# Patient Record
Sex: FEMALE | Race: WHITE | NOT HISPANIC OR LATINO | Employment: FULL TIME | URBAN - METROPOLITAN AREA
[De-identification: names, ages, dates, MRNs, and addresses within clinical notes are randomized per-mention and may not be internally consistent; named-entity substitution may affect disease eponyms.]

---
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
## 2021-07-12 ENCOUNTER — TELEPHONE (OUTPATIENT)
Dept: OBGYN CLINIC | Facility: CLINIC | Age: 21
End: 2021-07-12

## 2021-08-02 ENCOUNTER — VBI (OUTPATIENT)
Dept: ADMINISTRATIVE | Facility: OTHER | Age: 21
End: 2021-08-02

## 2021-08-02 NOTE — TELEPHONE ENCOUNTER
Upon review of the In Basket request we were able to locate, review, and update the patient chart as requested for Chlamydia  Any additional questions or concerns should be emailed to the Practice Liaisons via [email or web address] email, please do not reply via In Basket      Thank you  Ambrosio Wiseman

## 2021-09-23 ENCOUNTER — APPOINTMENT (OUTPATIENT)
Dept: URGENT CARE | Facility: CLINIC | Age: 21
End: 2021-09-23

## 2021-09-23 DIAGNOSIS — Z02.1 PRE-EMPLOYMENT HEALTH SCREENING EXAMINATION: Primary | ICD-10-CM

## 2021-09-23 PROCEDURE — 86480 TB TEST CELL IMMUN MEASURE: CPT

## 2021-09-27 LAB
GAMMA INTERFERON BACKGROUND BLD IA-ACNC: 0.04 IU/ML
M TB IFN-G BLD-IMP: NEGATIVE
M TB IFN-G CD4+ BCKGRND COR BLD-ACNC: -0.01 IU/ML
M TB IFN-G CD4+ BCKGRND COR BLD-ACNC: 0 IU/ML
MITOGEN IGNF BCKGRD COR BLD-ACNC: >10 IU/ML

## 2021-10-11 PROBLEM — Z97.5 IUD (INTRAUTERINE DEVICE) IN PLACE: Status: ACTIVE | Noted: 2021-10-11

## 2021-11-15 PROBLEM — E04.9 ENLARGED THYROID: Status: ACTIVE | Noted: 2021-11-15

## 2021-11-16 ENCOUNTER — ANNUAL EXAM (OUTPATIENT)
Dept: OBGYN CLINIC | Facility: CLINIC | Age: 21
End: 2021-11-16
Payer: COMMERCIAL

## 2021-11-16 VITALS
SYSTOLIC BLOOD PRESSURE: 122 MMHG | DIASTOLIC BLOOD PRESSURE: 76 MMHG | HEIGHT: 65 IN | BODY MASS INDEX: 29.66 KG/M2 | WEIGHT: 178 LBS

## 2021-11-16 DIAGNOSIS — Z97.5 IUD (INTRAUTERINE DEVICE) IN PLACE: ICD-10-CM

## 2021-11-16 DIAGNOSIS — Z01.419 ENCOUNTER FOR GYNECOLOGICAL EXAMINATION (GENERAL) (ROUTINE) WITHOUT ABNORMAL FINDINGS: Primary | ICD-10-CM

## 2021-11-16 DIAGNOSIS — Z12.4 SCREENING FOR MALIGNANT NEOPLASM OF THE CERVIX: ICD-10-CM

## 2021-11-16 PROCEDURE — 99395 PREV VISIT EST AGE 18-39: CPT | Performed by: OBSTETRICS & GYNECOLOGY

## 2021-11-24 LAB
C TRACH RRNA SPEC QL NAA+PROBE: NOT DETECTED
CLINICAL INFO: NORMAL
CYTO CVX: NORMAL
DATE PREVIOUS BX: NORMAL
LMP START DATE: NORMAL
N GONORRHOEA RRNA SPEC QL NAA+PROBE: NOT DETECTED
SL AMB PREV. PAP:: NORMAL
SPECIMEN SOURCE CVX/VAG CYTO: NORMAL

## 2022-09-13 ENCOUNTER — TELEPHONE (OUTPATIENT)
Dept: OBGYN CLINIC | Facility: CLINIC | Age: 22
End: 2022-09-13

## 2022-09-13 NOTE — TELEPHONE ENCOUNTER
Patient called stating she having some BTB with the paraguard IUD  She had her period 08/14/2022 and ever since, she been having on and off bleeding (not heavy)  She would change around 2-4 pads/tampons in a day  She does have cramping but nothing severe  She had the IUD inserted by Dr Ludwig Green 11/2020  She is SA w/ condom use so per pt states no chance of pregnancy  She did get blood work done through her PCP stating her HGB is normal and her iron panel is borderline low with her menses and recommend her taking iron supplement while on her period  This been going on for a month now with on and off BTB and would like Dr Della Padilla input in regards if this is normal or concerning where she need to come in for evaluation  Dr Nisreen Gomes please review

## 2022-09-14 NOTE — TELEPHONE ENCOUNTER
Spoke with patient and informed Dr Enrique Guajardo is recommending an appointment to evaluate the cervix, cultures if indicated and discuss options  consider  Pt was transferred to reception to assist patient  Crystal in reception informed patient was offered 2 appointments for September and   1 for October, d/t work schedule pt declined appointments  Pt informed reception she will call back and try to arrange work schedule

## 2022-09-14 NOTE — TELEPHONE ENCOUNTER
I think she should come in for an evaluation to check her cervix, culture if necessary and consider other options   Thanks

## 2022-10-08 NOTE — PROGRESS NOTES
Assessment/Plan:    Irregular bleeding  Bleeding since LMP 8/14/22, intermittently heavy, never stopping  Some cramping this week  Same partner for years  Recent job change from [employer] to [employer]  PCP with recent normal labs including CBC and TSH  Normal pelvic exam, IUD string in os  U/S ordered  Unable to provide urine for UPT; hcg ordered  When hcg resulted and neg, will start course of progesterone to regulate probable anovulatory cycle  Diagnoses and all orders for this visit:    Irregular bleeding  -     US pelvis complete w transvaginal; Future  -     norethindrone (AYGESTIN) 5 mg tablet; Take 1 tablet (5 mg total) by mouth daily for 33 doses One tablet three times daily for 2 days, then twice daily for 3 days, then daily to complete 21 days without bleeding   -     hCG, quantitative; Future  -     hCG, quantitative    IUD (intrauterine device) in place - Paragard 11/4/2020  -     US pelvis complete w transvaginal; Future    Other orders  -     PARAGARD INTRAUTERINE COPPER IU; by Intrauterine route          Subjective:      Patient ID: Grace Arreguin is a 25 y o  female  HPI Here with irregular bleeding    The following portions of the patient's history were reviewed and updated as appropriate:   She  has a past medical history of Multiple nevi and Otitis media  She   Patient Active Problem List    Diagnosis Date Noted   • Irregular bleeding 10/10/2022   • Encounter for gynecological examination (general) (routine) without abnormal findings 11/16/2021   • Enlarged thyroid - normal u/s and labs 2018, 2019 11/15/2021   • IUD (intrauterine device) in place - Paragard 11/4/2020 10/11/2021     She  has a past surgical history that includes ADENOIDECTOMY (2003); Mole removal; and Tympanostomy tube placement  Her family history includes Heart attack in her maternal grandfather and paternal grandfather; Hypertension in her father; No Known Problems in her mother    She  reports that she has never smoked  She has never used smokeless tobacco  She reports current alcohol use  She reports that she does not use drugs  Current Outpatient Medications   Medication Sig Dispense Refill   • norethindrone (AYGESTIN) 5 mg tablet Take 1 tablet (5 mg total) by mouth daily for 33 doses One tablet three times daily for 2 days, then twice daily for 3 days, then daily to complete 21 days without bleeding  33 tablet 0   • PARAGARD INTRAUTERINE COPPER IU by Intrauterine route       No current facility-administered medications for this visit  She is allergic to keflex [cephalexin]       Review of Systems  +prolonged bleeding +cramping    Objective:      /60 (BP Location: Left arm, Patient Position: Sitting, Cuff Size: Large)   Ht 5' 3 75" (1 619 m)   Wt 79 6 kg (175 lb 6 4 oz)   LMP 08/14/2022 (Exact Date) Comment: still bleeding   Breastfeeding No   BMI 30 34 kg/m²          Physical Exam    General appearance: no distress, pleasant  Pelvic exam: normal external genitalia, urethral meatus normal, vagina without lesions, cervix with IUD string in os, without lesions, uterus small, non tender, no adnexal masses, non tender  Rectal exam: deferred

## 2022-10-10 ENCOUNTER — OFFICE VISIT (OUTPATIENT)
Dept: OBGYN CLINIC | Facility: CLINIC | Age: 22
End: 2022-10-10

## 2022-10-10 VITALS
BODY MASS INDEX: 29.94 KG/M2 | DIASTOLIC BLOOD PRESSURE: 60 MMHG | SYSTOLIC BLOOD PRESSURE: 138 MMHG | HEIGHT: 64 IN | WEIGHT: 175.4 LBS

## 2022-10-10 DIAGNOSIS — Z97.5 IUD (INTRAUTERINE DEVICE) IN PLACE: ICD-10-CM

## 2022-10-10 DIAGNOSIS — N92.6 IRREGULAR BLEEDING: Primary | ICD-10-CM

## 2022-10-10 NOTE — LETTER
October 10, 2022     DO Jack Ellisi  [id number]    Patient: Ja Hetaon   YOB: 2000   Date of Visit: 10/10/2022       Dear Dr Mehnaz Marx: Thank you for referring Zheng Nahomyace to me for evaluation  Below are my notes for this consultation  If you have questions, please do not hesitate to call me  I look forward to following your patient along with you  Sincerely,        Fernie Elkins MD        CC: No Recipients  Fernie Elkins MD  10/10/2022  5:05 PM  Sign when Signing Visit  Assessment/Plan:    Irregular bleeding  Bleeding since LMP 8/14/22, intermittently heavy, never stopping  Some cramping this week  Same partner for years  Recent job change from [employer] to [employer]  PCP with recent normal labs including CBC and TSH  Normal pelvic exam, IUD string in os  U/S ordered  Unable to provide urine for UPT; hcg ordered  When hcg resulted and neg, will start course of progesterone to regulate probable anovulatory cycle  Diagnoses and all orders for this visit:    Irregular bleeding  -     US pelvis complete w transvaginal; Future  -     norethindrone (AYGESTIN) 5 mg tablet; Take 1 tablet (5 mg total) by mouth daily for 33 doses One tablet three times daily for 2 days, then twice daily for 3 days, then daily to complete 21 days without bleeding   -     hCG, quantitative; Future  -     hCG, quantitative    IUD (intrauterine device) in place - Paragard 11/4/2020  -     US pelvis complete w transvaginal; Future    Other orders  -     PARAGARD INTRAUTERINE COPPER IU; by Intrauterine route          Subjective:      Patient ID: Ja Heaton is a 25 y o  female  HPI Here with irregular bleeding    The following portions of the patient's history were reviewed and updated as appropriate:   She  has a past medical history of Multiple nevi and Otitis media    She   Patient Active Problem List    Diagnosis Date Noted   • Irregular bleeding 10/10/2022 • Encounter for gynecological examination (general) (routine) without abnormal findings 11/16/2021   • Enlarged thyroid - normal u/s and labs 2018, 2019 11/15/2021   • IUD (intrauterine device) in place - Paragard 11/4/2020 10/11/2021     She  has a past surgical history that includes ADENOIDECTOMY (2003); Mole removal; and Tympanostomy tube placement  Her family history includes Heart attack in her maternal grandfather and paternal grandfather; Hypertension in her father; No Known Problems in her mother  She  reports that she has never smoked  She has never used smokeless tobacco  She reports current alcohol use  She reports that she does not use drugs  Current Outpatient Medications   Medication Sig Dispense Refill   • norethindrone (AYGESTIN) 5 mg tablet Take 1 tablet (5 mg total) by mouth daily for 33 doses One tablet three times daily for 2 days, then twice daily for 3 days, then daily to complete 21 days without bleeding  33 tablet 0   • PARAGARD INTRAUTERINE COPPER IU by Intrauterine route       No current facility-administered medications for this visit  She is allergic to keflex [cephalexin]       Review of Systems  +prolonged bleeding +cramping    Objective:      /60 (BP Location: Left arm, Patient Position: Sitting, Cuff Size: Large)   Ht 5' 3 75" (1 619 m)   Wt 79 6 kg (175 lb 6 4 oz)   LMP 08/14/2022 (Exact Date) Comment: still bleeding   Breastfeeding No   BMI 30 34 kg/m²          Physical Exam    General appearance: no distress, pleasant  Pelvic exam: normal external genitalia, urethral meatus normal, vagina without lesions, cervix with IUD string in os, without lesions, uterus small, non tender, no adnexal masses, non tender  Rectal exam: deferred

## 2022-10-10 NOTE — ASSESSMENT & PLAN NOTE
Bleeding since LMP 8/14/22, intermittently heavy, never stopping  Some cramping this week  Same partner for years  Recent job change from [employer] to [employer]  PCP with recent normal labs including CBC and TSH  Normal pelvic exam, IUD string in os  U/S ordered  Unable to provide urine for UPT; hcg ordered  When hcg resulted and neg, will start course of progesterone to regulate probable anovulatory cycle

## 2022-10-10 NOTE — PATIENT INSTRUCTIONS
Schedule your pelvic ultrasound with [facility] [phone]  Please call the office if you do not hear about your results in 10-14 days

## 2022-10-11 ENCOUNTER — TELEPHONE (OUTPATIENT)
Dept: OBGYN CLINIC | Facility: CLINIC | Age: 22
End: 2022-10-11

## 2022-10-11 NOTE — TELEPHONE ENCOUNTER
Spoke with Dr Jordan Iqbal  She previously sent rx and okay with patient starting med tonight  Called and left v/m that pt may start rx tonight  Our phones are going off but we would be available to answer questions tomorrow morning at [phone]

## 2022-10-11 NOTE — TELEPHONE ENCOUNTER
Maxine Warner called in and left v/m  She was unable to provide urine sample at visit yesterday so Dr Mary Alice Reyes ordered hcg before starting on progesterone  Pt got labs done today and is sending them via my chart but states < 1, has US scheduled for tomorrow and curious if Dr MaryA lice Reyes wants to order her progesterone rx to be started today or if she should wait until after US  [address], Dr Mary Alice Reyes with pt but message left with Carole El to request call back from Dr Mary Alice Reyes

## 2022-10-28 DIAGNOSIS — N92.6 IRREGULAR BLEEDING: Primary | ICD-10-CM

## 2022-10-28 RX ORDER — NORGESTIMATE AND ETHINYL ESTRADIOL 0.25-0.035
KIT ORAL
Qty: 28 TABLET | Refills: 1 | Status: SHIPPED | OUTPATIENT
Start: 2022-10-28 | End: 2022-11-22 | Stop reason: ALTCHOICE

## 2022-11-19 NOTE — PROGRESS NOTES
Assessment/Plan:    Encounter for gynecological examination (general) (routine) without abnormal findings  Stopped bleeding on OCPs 2 weeks ago  No other concerns  Normal breast and pelvic exams  Cultures done  Last pap 11/2021 neg; due 2024         Diagnoses and all orders for this visit:    Encounter for gynecological examination (general) (routine) without abnormal findings    Routine screening for STI (sexually transmitted infection)  -     [address] [place] [address] amplified DNA by PCR    IUD (intrauterine device) in place - Paragard 11/4/2020          Subjective:      Patient ID: Kay Angulo is a 25 y o  female  HPI Here for well check  The following portions of the patient's history were reviewed and updated as appropriate:   She  has a past medical history of Multiple nevi and Otitis media  She   Patient Active Problem List    Diagnosis Date Noted   • Encounter for gynecological examination (general) (routine) without abnormal findings 11/16/2021   • Enlarged thyroid - normal u/s and labs 2018, 2019 11/15/2021   • IUD (intrauterine device) in place - Paragard 11/4/2020 10/11/2021     She  has a past surgical history that includes ADENOIDECTOMY (2003); Mole removal; and Tympanostomy tube placement  Her family history includes Heart attack in her maternal grandfather and paternal grandfather; Hypertension in her father; Stroke in her paternal grandfather; Thyroid disease in her mother; Transient ischemic attack in her paternal grandmother  She  reports that she has never smoked  She has never used smokeless tobacco  She reports current alcohol use of about 1 0 standard drink per week  She reports that she does not use drugs    Current Outpatient Medications   Medication Sig Dispense Refill   • PARAGARD INTRAUTERINE COPPER IU by Intrauterine route     • norethindrone (AYGESTIN) 5 mg tablet Take 1 tablet (5 mg total) by mouth daily for 33 doses One tablet three times daily for 2 days, then twice daily for 3 days, then daily to complete 21 days without bleeding  33 tablet 0     No current facility-administered medications for this visit  She is allergic to keflex [cephalexin]       Review of Systems  No breast, bladder, bowel complaints    Objective:      /72 (BP Location: Left arm, Patient Position: Sitting, Cuff Size: Standard)   Ht 5' 3 75" (1 619 m)   Wt 81 4 kg (179 lb 6 4 oz)   BMI 31 04 kg/m²          Physical Exam    General appearance: no distress, pleasant  Neck: thyroid without nodules or thyromegaly, no palpable adenopathy  Lymph nodes: no palpable adenopathy  Skin with multiple nevi (sees plastics)  Breasts: no masses, nodes or skin changes  Abdomen: soft, non tender, no palpable masses  Pelvic exam: normal external genitalia, urethral meatus normal, vagina without lesions, cervix high in vault, IUD string in os, no lesions, uterus small, non tender, no adnexal masses, non tender  Rectal exam: deferred

## 2022-11-22 ENCOUNTER — OFFICE VISIT (OUTPATIENT)
Dept: OBGYN CLINIC | Facility: CLINIC | Age: 22
End: 2022-11-22

## 2022-11-22 VITALS
HEIGHT: 64 IN | DIASTOLIC BLOOD PRESSURE: 72 MMHG | BODY MASS INDEX: 30.63 KG/M2 | WEIGHT: 179.4 LBS | SYSTOLIC BLOOD PRESSURE: 110 MMHG

## 2022-11-22 DIAGNOSIS — Z11.3 ROUTINE SCREENING FOR STI (SEXUALLY TRANSMITTED INFECTION): ICD-10-CM

## 2022-11-22 DIAGNOSIS — Z01.419 ENCOUNTER FOR GYNECOLOGICAL EXAMINATION (GENERAL) (ROUTINE) WITHOUT ABNORMAL FINDINGS: Primary | ICD-10-CM

## 2022-11-22 DIAGNOSIS — Z97.5 IUD (INTRAUTERINE DEVICE) IN PLACE: ICD-10-CM

## 2022-11-22 PROBLEM — N92.6 IRREGULAR BLEEDING: Status: RESOLVED | Noted: 2022-10-10 | Resolved: 2022-11-22

## 2022-11-22 NOTE — ASSESSMENT & PLAN NOTE
Stopped bleeding on OCPs 2 weeks ago  No other concerns  Normal breast and pelvic exams    Cultures done  Last pap 11/2021 neg; due 2024

## 2022-11-22 NOTE — LETTER
November 22, 2022     Meche Casey DO  [facility]  [address]    Patient: Levon Mckee   YOB: 2000   Date of Visit: 11/22/2022       Dear Dr Derick Plaza: Thank you for referring Artur Giang to me for evaluation  Below are my notes for this consultation  If you have questions, please do not hesitate to call me  I look forward to following your patient along with you  Sincerely,        Paradise Calhoun MD        CC: No Recipients  Paradise Calhoun MD  11/22/2022  3:45 PM  Sign when Signing Visit  Assessment/Plan:    Encounter for gynecological examination (general) (routine) without abnormal findings  Stopped bleeding on OCPs 2 weeks ago  No other concerns  Normal breast and pelvic exams  Cultures done  Last pap 11/2021 neg; due 2024        Diagnoses and all orders for this visit:    Encounter for gynecological examination (general) (routine) without abnormal findings    Routine screening for STI (sexually transmitted infection)  -     [address] [place] [address] amplified DNA by PCR    IUD (intrauterine device) in place - [facility] 11/4/2020         Subjective:     Patient ID: Levon Mckee is a 25 y o  female  HPI Here for well check  The following portions of the patient's history were reviewed and updated as appropriate:   She  has a past medical history of Multiple nevi and Otitis media  She   Patient Active Problem List    Diagnosis Date Noted   • Encounter for gynecological examination (general) (routine) without abnormal findings 11/16/2021   • Enlarged thyroid - normal u/s and labs 2018, 2019 11/15/2021   • IUD (intrauterine device) in place - Paragard 11/4/2020 10/11/2021     She  has a past surgical history that includes ADENOIDECTOMY (2003); Mole removal; and Tympanostomy tube placement  Her family history includes Heart attack in her maternal grandfather and paternal grandfather; Hypertension in her father; Stroke in her paternal grandfather;  Thyroid disease in her mother; Transient ischemic attack in her paternal grandmother  She  reports that she has never smoked  She has never used smokeless tobacco  She reports current alcohol use of about 1 0 standard drink per week  She reports that she does not use drugs  Current Outpatient Medications   Medication Sig Dispense Refill   • PARAGARD INTRAUTERINE COPPER IU by Intrauterine route     • norethindrone (AYGESTIN) 5 mg tablet Take 1 tablet (5 mg total) by mouth daily for 33 doses One tablet three times daily for 2 days, then twice daily for 3 days, then daily to complete 21 days without bleeding  33 tablet 0     No current facility-administered medications for this visit  She is allergic to keflex [cephalexin]       Review of Systems No breast, bladder, bowel complaints    Objective:      /72 (BP Location: Left arm, Patient Position: Sitting, Cuff Size: Standard)   Ht 5' 3 75" (1 619 m)   Wt 81 4 kg (179 lb 6 4 oz)   BMI 31 04 kg/m²         Physical Exam   General appearance: no distress, pleasant  Neck: thyroid without nodules or thyromegaly, no palpable adenopathy  Lymph nodes: no palpable adenopathy  Skin with multiple nevi (sees plastics)  Breasts: no masses, nodes or skin changes  Abdomen: soft, non tender, no palpable masses  Pelvic exam: normal external genitalia, urethral meatus normal, vagina without lesions, cervix high in vault, IUD string in os, no lesions, uterus small, non tender, no adnexal masses, non tender  Rectal exam: deferred

## 2022-11-22 NOTE — PATIENT INSTRUCTIONS
Return to office in one year unless having any problems  Call in six months to schedule your annual visit

## 2022-11-24 LAB
C TRACH RRNA SPEC QL NAA+PROBE: NEGATIVE
N GONORRHOEA RRNA SPEC QL NAA+PROBE: NEGATIVE

## 2023-11-29 NOTE — PROGRESS NOTES
Assessment/Plan:    Encounter for gynecological examination (general) (routine) without abnormal findings  Here for well check, no complaints. Happy with ParaGard, monthly tolerable cycles, no IMB. Same partner x 7 years. Working at [employer], surgical short stay. Normal breast and pelvic exams. Cultures done. Last pap 11/2021 neg; due next time. Multiple nevi managed by Dr Fito Villaseñor in past, will refer to dermatology. Provider information given. Diagnoses and all orders for this visit:    Encounter for gynecological examination (general) (routine) without abnormal findings    IUD (intrauterine device) in place - Paragard 11/4/2020    Routine screening for STI (sexually transmitted infection)  -     Chlamydia/GC amplified DNA by PCR    Other orders  -     buPROPion (WELLBUTRIN XL) 150 mg 24 hr tablet; Take 150 mg by mouth daily  -     Cholecalciferol (Vitamin D3) 1.25 MG (48389 UT) CAPS; USE ONE EVERY TWO WEEKS FROM APRIL TO OCTOBER AND ONE WEEKLY FROM OCTOBER TO APRIL          Subjective:      Patient ID: Rasheed Ames is a 21 y.o. female. HPI Here for well check. The following portions of the patient's history were reviewed and updated as appropriate: She  has a past medical history of Gardasil complete, Multiple nevi, and Otitis media. She   Patient Active Problem List    Diagnosis Date Noted    Encounter for gynecological examination (general) (routine) without abnormal findings 11/16/2021    Enlarged thyroid - normal u/s and labs 2018, 2019 11/15/2021    IUD (intrauterine device) in place - Paragard 11/4/2020 10/11/2021     She  has a past surgical history that includes ADENOIDECTOMY (2003); Mole removal; and Tympanostomy tube placement. Her family history includes Heart attack in her maternal grandfather and paternal grandfather; Hypertension in her father; Stroke in her paternal grandfather; Thyroid disease in her mother; Transient ischemic attack in her paternal grandmother.   She reports that she has never smoked. She has never been exposed to tobacco smoke. She has never used smokeless tobacco. She reports current alcohol use of about 1.0 standard drink of alcohol per week. She reports that she does not use drugs. Current Outpatient Medications   Medication Sig Dispense Refill    buPROPion (WELLBUTRIN XL) 150 mg 24 hr tablet Take 150 mg by mouth daily      Cholecalciferol (Vitamin D3) 1.25 MG (43184 UT) CAPS USE ONE EVERY TWO WEEKS FROM APRIL TO OCTOBER AND ONE WEEKLY FROM OCTOBER TO APRIL      [facility] INTRAUTERINE COPPER IU by Intrauterine route       No current facility-administered medications for this visit. She is allergic to keflex [cephalexin]. .    Review of Systems  No breast, bladder, bowel changes.  No new persistent pain, bloating, early satiety or pelvic pressure  2/5 days tolerable menorrhagia, no sig dysmenorrhea, no intermenstrual bleeding      Objective:      /80 (BP Location: Left arm, Patient Position: Sitting, Cuff Size: Standard)   Ht 5' 4" (1.626 m)   Wt 80.7 kg (178 lb)   LMP 11/16/2023   BMI 30.55 kg/m²          Physical Exam    General appearance: no distress, pleasant  Neck: thyroid without nodules or thyromegaly, no palpable adenopathy  Lymph nodes: no palpable adenopathy  Breasts: no masses, nodes or skin changes  Skin with multiple nevi  Abdomen: soft, non tender, no palpable masses  Pelvic exam: normal external genitalia, urethral meatus normal, vagina without lesions, cervix with IUD string in os and without lesions, uterus small, non tender, no adnexal masses, non tender  Rectal exam: deferred

## 2023-11-30 ENCOUNTER — ANNUAL EXAM (OUTPATIENT)
Dept: OBGYN CLINIC | Facility: CLINIC | Age: 23
End: 2023-11-30
Payer: COMMERCIAL

## 2023-11-30 VITALS
BODY MASS INDEX: 30.39 KG/M2 | HEIGHT: 64 IN | WEIGHT: 178 LBS | SYSTOLIC BLOOD PRESSURE: 130 MMHG | DIASTOLIC BLOOD PRESSURE: 80 MMHG

## 2023-11-30 DIAGNOSIS — Z11.3 ROUTINE SCREENING FOR STI (SEXUALLY TRANSMITTED INFECTION): ICD-10-CM

## 2023-11-30 DIAGNOSIS — Z01.419 ENCOUNTER FOR GYNECOLOGICAL EXAMINATION (GENERAL) (ROUTINE) WITHOUT ABNORMAL FINDINGS: Primary | ICD-10-CM

## 2023-11-30 DIAGNOSIS — Z97.5 IUD (INTRAUTERINE DEVICE) IN PLACE: ICD-10-CM

## 2023-11-30 PROCEDURE — S0612 ANNUAL GYNECOLOGICAL EXAMINA: HCPCS | Performed by: OBSTETRICS & GYNECOLOGY

## 2023-11-30 RX ORDER — CHOLECALCIFEROL (VITAMIN D3) 1250 MCG
CAPSULE ORAL
COMMUNITY
Start: 2023-10-30

## 2023-11-30 RX ORDER — BUPROPION HYDROCHLORIDE 150 MG/1
150 TABLET ORAL DAILY
COMMUNITY
Start: 2023-10-17

## 2023-11-30 NOTE — LETTER
November 30, 2023     DO Martha Us  [address]    Patient: Porsha Simeon   YOB: 2000   Date of Visit: 11/30/2023       Dear Dr. Jeannine Toro:    Marcos Kwon was in today for her annual gyn exam. Below are my notes for this consultation. If you have questions, please do not hesitate to call me. I look forward to following your patient along with you. Sincerely,        Asia Sawyer MD        CC: No Recipients    Asia Sawyer MD  11/30/2023  1:24 PM  Sign when Signing Visit  Assessment/Plan:    Encounter for gynecological examination (general) (routine) without abnormal findings  Here for well check, no complaints. Happy with ParaGard, monthly tolerable cycles, no IMB. Same partner x 7 years. Working at [employer], surgical short stay. Normal breast and pelvic exams. Cultures done. Last pap 11/2021 neg; due next time. Multiple nevi managed by Dr Lan Salvage in past, will refer to dermatology. Provider information given. Diagnoses and all orders for this visit:    Encounter for gynecological examination (general) (routine) without abnormal findings    IUD (intrauterine device) in place - Paragard 11/4/2020    Routine screening for STI (sexually transmitted infection)  -     Chlamydia/GC amplified DNA by PCR    Other orders  -     buPROPion (WELLBUTRIN XL) 150 mg 24 hr tablet; Take 150 mg by mouth daily  -     Cholecalciferol (Vitamin D3) 1.25 MG (65487 UT) CAPS; USE ONE EVERY TWO WEEKS FROM APRIL TO OCTOBER AND ONE WEEKLY FROM OCTOBER TO APRIL          Subjective:      Patient ID: Porsha Simeon is a 21 y.o. female. HPI Here for well check. The following portions of the patient's history were reviewed and updated as appropriate: She  has a past medical history of Gardasil complete, Multiple nevi, and Otitis media.   She   Patient Active Problem List    Diagnosis Date Noted   • Encounter for gynecological examination (general) (routine) without abnormal findings 11/16/2021   • Enlarged thyroid - normal u/s and labs 2018, 2019 11/15/2021   • IUD (intrauterine device) in place - Paragard 11/4/2020 10/11/2021     She  has a past surgical history that includes ADENOIDECTOMY (2003); Mole removal; and Tympanostomy tube placement. Her family history includes Heart attack in her maternal grandfather and paternal grandfather; Hypertension in her father; Stroke in her paternal grandfather; Thyroid disease in her mother; Transient ischemic attack in her paternal grandmother. She  reports that she has never smoked. She has never been exposed to tobacco smoke. She has never used smokeless tobacco. She reports current alcohol use of about 1.0 standard drink of alcohol per week. She reports that she does not use drugs. Current Outpatient Medications   Medication Sig Dispense Refill   • buPROPion (WELLBUTRIN XL) 150 mg 24 hr tablet Take 150 mg by mouth daily     • Cholecalciferol (Vitamin D3) 1.25 MG (84861 UT) CAPS USE ONE EVERY TWO WEEKS FROM APRIL TO OCTOBER AND ONE WEEKLY FROM OCTOBER TO APRIL     • PARAGARD INTRAUTERINE COPPER IU by Intrauterine route       No current facility-administered medications for this visit. She is allergic to keflex [cephalexin]. .    Review of Systems  No breast, bladder, bowel changes.  No new persistent pain, bloating, early satiety or pelvic pressure  2/5 days tolerable menorrhagia, no sig dysmenorrhea, no intermenstrual bleeding      Objective:      /80 (BP Location: Left arm, Patient Position: Sitting, Cuff Size: Standard)   Ht 5' 4" (1.626 m)   Wt 80.7 kg (178 lb)   LMP 11/16/2023   BMI 30.55 kg/m²          Physical Exam    General appearance: no distress, pleasant  Neck: thyroid without nodules or thyromegaly, no palpable adenopathy  Lymph nodes: no palpable adenopathy  Breasts: no masses, nodes or skin changes  Skin with multiple nevi  Abdomen: soft, non tender, no palpable masses  Pelvic exam: normal external genitalia, urethral meatus normal, vagina without lesions, cervix with IUD string in os and without lesions, uterus small, non tender, no adnexal masses, non tender  Rectal exam: deferred

## 2023-11-30 NOTE — PATIENT INSTRUCTIONS
Return to office in one year unless having any problems. Call in six months to schedule your annual visit. Dr Carl Montero   [address] [facility], [address] [facility]  Phone: [phone]    Dr. Galileo Gavin, dermatology. [phone].  [email or web address].   Located at [address] [place] [address]

## 2023-11-30 NOTE — ASSESSMENT & PLAN NOTE
Here for well check, no complaints. Happy with ParaGard, monthly tolerable cycles, no IMB. Same partner x 7 years. Working at [employer], surgical short stay. Normal breast and pelvic exams. Cultures done. Last pap 11/2021 neg; due next time. Multiple nevi managed by Dr Andrae Martin in past, will refer to dermatology. Provider information given.

## 2023-12-10 LAB
C TRACH RRNA SPEC QL NAA+PROBE: NEGATIVE
N GONORRHOEA RRNA SPEC QL NAA+PROBE: NEGATIVE

## 2024-02-08 NOTE — TELEPHONE ENCOUNTER
Amy Dewitt l/m she had paraguard insert in November  She has been having daily bleeding of some sort  Some days regular bleeding, some days spotting  Varies from red and brown  L/M on patient v/m to return call 
Carlita Troy left a message to be called back tomorrow 07/13 prior to 2 pm  Carlita Troy said was going to work now 
Was unable to contact patient prior to 2pm   L/M on patient v/m her concern is not unusual   If her issue continues and is not manageable for her recommend call back to schedule appt with provider to discuss
Price (Do Not Change): 0.00
Detail Level: Generalized
Instructions: This plan will send the code FBSE to the PM system.  DO NOT or CHANGE the price.

## 2024-02-21 PROBLEM — Z01.419 ENCOUNTER FOR GYNECOLOGICAL EXAMINATION (GENERAL) (ROUTINE) WITHOUT ABNORMAL FINDINGS: Status: RESOLVED | Noted: 2021-11-16 | Resolved: 2024-02-21

## 2024-08-08 ENCOUNTER — PATIENT MESSAGE (OUTPATIENT)
Age: 24
End: 2024-08-08

## 2024-11-30 NOTE — PROGRESS NOTES
Assessment/Plan:    Encounter for gynecological examination (general) (routine) without abnormal findings  Here for well check, no complaints.   Happy with Paragard, monthly menses. No IMB   Same partner x 8 years.     Normal breast and pelvic exams. Pap done. Last pap 11/2021 neg.   Continues to see derm annually for nevi monitoring.     Diagnoses and all orders for this visit:    Encounter for gynecological examination (general) (routine) without abnormal findings    Multiple nevi       Subjective:      Patient ID: Bing Flanagan is a 24 y.o. female.    HPI Here for well check.    The following portions of the patient's history were reviewed and updated as appropriate: She  has a past medical history of Gardasil complete, Multiple nevi, Otitis media, and Tachycardia.  She   Patient Active Problem List    Diagnosis Date Noted    Multiple nevi     Enlarged thyroid - normal u/s and labs 2018, 2019 11/15/2021    IUD (intrauterine device) in place - Paragard 11/4/2020 10/11/2021     She  has a past surgical history that includes ADENOIDECTOMY (2003); Mole removal; and Tympanostomy tube placement.  Her family history includes Cancer in her maternal aunt; Colon cancer in her paternal grandmother; Diabetes in her paternal grandmother; Heart attack in her maternal grandfather and paternal grandfather; Hypertension in her brother, father, and paternal grandmother; Kidney disease in her maternal grandfather; No Known Problems in her maternal grandmother; Osteoporosis in her paternal grandmother; Skin cancer in her paternal aunt; Stroke in her paternal grandfather; Thyroid disease in her mother; Transient ischemic attack in her paternal grandmother.  She  reports that she has never smoked. She has never been exposed to tobacco smoke. She has never used smokeless tobacco. She reports current alcohol use of about 1.0 standard drink of alcohol per week. She reports that she does not use drugs.  Current Outpatient  "Medications   Medication Sig Dispense Refill    Cholecalciferol (Vitamin D3) 1.25 MG (46120 UT) CAPS USE ONE EVERY TWO WEEKS FROM APRIL TO OCTOBER AND ONE WEEKLY FROM OCTOBER TO APRIL      PARAGARD INTRAUTERINE COPPER IU by Intrauterine route       No current facility-administered medications for this visit.     She is allergic to keflex [cephalexin]..    Review of Systems  No breast, bladder, bowel changes. No new persistent pain, bloating, early satiety or pelvic pressure  No cycle concerns    Objective:    /58 (BP Location: Left arm, Patient Position: Sitting, Cuff Size: Large)   Ht 5' 3.75\" (1.619 m)   Wt 82.2 kg (181 lb 3.2 oz)   LMP 11/18/2024 (Exact Date)   BMI 31.35 kg/m²      Physical Exam    General appearance: no distress, pleasant  Neck: thyroid without nodules or thyromegaly, no palpable adenopathy  Lymph nodes: no palpable adenopathy  Skin with multiple nevi  Breasts: no masses, nodes or skin changes  Abdomen: soft, non tender, no palpable masses  Pelvic exam: normal external genitalia, urethral meatus normal, vagina without lesions, cervix without lesions, IUD string in os,  uterus small, non tender, no adnexal masses, non tender  Rectal exam: deferred    "

## 2024-12-03 ENCOUNTER — ANNUAL EXAM (OUTPATIENT)
Dept: OBGYN CLINIC | Facility: CLINIC | Age: 24
End: 2024-12-03
Payer: COMMERCIAL

## 2024-12-03 VITALS
BODY MASS INDEX: 30.93 KG/M2 | WEIGHT: 181.2 LBS | HEIGHT: 64 IN | DIASTOLIC BLOOD PRESSURE: 58 MMHG | SYSTOLIC BLOOD PRESSURE: 120 MMHG

## 2024-12-03 DIAGNOSIS — Z12.4 SCREENING FOR MALIGNANT NEOPLASM OF THE CERVIX: ICD-10-CM

## 2024-12-03 DIAGNOSIS — D22.9 MULTIPLE NEVI: ICD-10-CM

## 2024-12-03 DIAGNOSIS — Z01.419 ENCOUNTER FOR GYNECOLOGICAL EXAMINATION (GENERAL) (ROUTINE) WITHOUT ABNORMAL FINDINGS: Primary | ICD-10-CM

## 2024-12-03 PROCEDURE — 99395 PREV VISIT EST AGE 18-39: CPT | Performed by: OBSTETRICS & GYNECOLOGY

## 2024-12-03 NOTE — LETTER
December 3, 2024     Norma Shin DO  [address]  [address]  [facility] [address]    Patient: Bing Flanagan   YOB: 2000   Date of Visit: 12/3/2024       Dear Dr. Shin:    Bing Flanagan was in to see me today for evaluation. Below are my notes for this visit.    If you have questions, please do not hesitate to call me. I look forward to following your patient along with you.         Sincerely,        Saba Kaiser MD        CC: No Recipients    Saba Kaiser MD  12/3/2024  3:21 PM  Sign when Signing Visit  Assessment/Plan:    Encounter for gynecological examination (general) (routine) without abnormal findings  Here for well check, no complaints.   Happy with Paragard, monthly menses. No IMB   Same partner x 8 years.     Normal breast and pelvic exams. Pap done. Last pap 11/2021 neg.   Continues to see derm annually for nevi monitoring.     Diagnoses and all orders for this visit:    Encounter for gynecological examination (general) (routine) without abnormal findings    Multiple nevi       Subjective:      Patient ID: Bing Flanagan is a 24 y.o. female.    HPI Here for well check.    The following portions of the patient's history were reviewed and updated as appropriate: She  has a past medical history of Gardasil complete, Multiple nevi, Otitis media, and Tachycardia.  She   Patient Active Problem List    Diagnosis Date Noted   • Multiple nevi    • Enlarged thyroid - normal u/s and labs 2018, 2019 11/15/2021   • IUD (intrauterine device) in place - Paragard 11/4/2020 10/11/2021     She  has a past surgical history that includes ADENOIDECTOMY (2003); Mole removal; and Tympanostomy tube placement.  Her family history includes Cancer in her maternal aunt; Colon cancer in her paternal grandmother; Diabetes in her paternal grandmother; Heart attack in her maternal grandfather and paternal grandfather; Hypertension in her brother, father, and paternal grandmother; Kidney disease in  "her maternal grandfather; No Known Problems in her maternal grandmother; Osteoporosis in her paternal grandmother; Skin cancer in her paternal aunt; Stroke in her paternal grandfather; Thyroid disease in her mother; Transient ischemic attack in her paternal grandmother.  She  reports that she has never smoked. She has never been exposed to tobacco smoke. She has never used smokeless tobacco. She reports current alcohol use of about 1.0 standard drink of alcohol per week. She reports that she does not use drugs.  Current Outpatient Medications   Medication Sig Dispense Refill   • Cholecalciferol (Vitamin D3) 1.25 MG (32857 UT) CAPS USE ONE EVERY TWO WEEKS FROM APRIL TO OCTOBER AND ONE WEEKLY FROM OCTOBER TO APRIL     • PARAGARD INTRAUTERINE COPPER IU by Intrauterine route       No current facility-administered medications for this visit.     She is allergic to keflex [cephalexin]..    Review of Systems  No breast, bladder, bowel changes. No new persistent pain, bloating, early satiety or pelvic pressure  No cycle concerns    Objective:    /58 (BP Location: Left arm, Patient Position: Sitting, Cuff Size: Large)   Ht 5' 3.75\" (1.619 m)   Wt 82.2 kg (181 lb 3.2 oz)   LMP 11/18/2024 (Exact Date)   BMI 31.35 kg/m²      Physical Exam    General appearance: no distress, pleasant  Neck: thyroid without nodules or thyromegaly, no palpable adenopathy  Lymph nodes: no palpable adenopathy  Skin with multiple nevi  Breasts: no masses, nodes or skin changes  Abdomen: soft, non tender, no palpable masses  Pelvic exam: normal external genitalia, urethral meatus normal, vagina without lesions, cervix without lesions, IUD string in os,  uterus small, non tender, no adnexal masses, non tender  Rectal exam: deferred      "

## 2024-12-03 NOTE — ASSESSMENT & PLAN NOTE
Here for well check, no complaints.   Happy with Paragard, monthly menses. No IMB   Same partner x 8 years.     Normal breast and pelvic exams. Pap done. Last pap 11/2021 neg.   Continues to see derm annually for nevi monitoring.

## 2024-12-09 ENCOUNTER — RESULTS FOLLOW-UP (OUTPATIENT)
Dept: OBGYN CLINIC | Facility: CLINIC | Age: 24
End: 2024-12-09

## 2024-12-09 LAB
CYTOLOGIST CVX/VAG CYTO: NORMAL
DX ICD CODE: NORMAL
OTHER STN SPEC: NORMAL
OTHER STN SPEC: NORMAL
PATH REPORT.FINAL DX SPEC: NORMAL
SL AMB NOTE:: NORMAL
SL AMB SPECIMEN ADEQUACY: NORMAL
SL AMB TEST METHODOLOGY: NORMAL